# Patient Record
Sex: MALE | Race: WHITE | NOT HISPANIC OR LATINO | URBAN - METROPOLITAN AREA
[De-identification: names, ages, dates, MRNs, and addresses within clinical notes are randomized per-mention and may not be internally consistent; named-entity substitution may affect disease eponyms.]

---

## 2019-03-07 NOTE — PATIENT DISCUSSION
""" to review testing with patient today."" ""Reassured patient that intraocular pressures (IOPs) are at target levels and other ocular findings are stable. Continue present management and/or medication(s).  Follow up as directed. """

## 2021-07-14 NOTE — PATIENT DISCUSSION
IOP stable OU with Timolol use. Continue current drop therapy. Schedule HVF/RNFL OCT at or prior to next visit.

## 2022-02-10 NOTE — PATIENT DISCUSSION
IOP stable OU with Timolol use. Continue current drop therapy.  HVF/RNFL OCT  reviewed with patient today.

## 2022-02-10 NOTE — PATIENT DISCUSSION
Discussed AREDS2 supplements, BP Control, UV protection and dark leafy green vegetables. Intermediate / Complex Repair - Final Wound Length In Cm: 5.5

## 2022-09-01 NOTE — PATIENT DISCUSSION
IOP stable OU with Timolol use. Continue current drop therapy. Follow up in 6 months HVF/RNFL OCT with IOP check.

## 2023-01-30 ENCOUNTER — NEW PATIENT (OUTPATIENT)
Dept: URBAN - METROPOLITAN AREA CLINIC 14 | Facility: CLINIC | Age: 78
End: 2023-01-30

## 2023-01-30 DIAGNOSIS — H25.13: ICD-10-CM

## 2023-01-30 PROCEDURE — 99204 OFFICE O/P NEW MOD 45 MIN: CPT

## 2023-01-30 PROCEDURE — 92015 DETERMINE REFRACTIVE STATE: CPT

## 2023-01-30 PROCEDURE — 92136 OPHTHALMIC BIOMETRY: CPT

## 2023-01-30 ASSESSMENT — KERATOMETRY
OS_K1POWER_DIOPTERS: 42.0
OD_AXISANGLE2_DEGREES: 90
OS_AXISANGLE_DEGREES: 97
OD_K1POWER_DIOPTERS: 42.5
OS_AXISANGLE2_DEGREES: 7
OS_K2POWER_DIOPTERS: 42.75
OD_K2POWER_DIOPTERS: 42.5
OD_AXISANGLE_DEGREES: 180

## 2023-01-30 ASSESSMENT — VISUAL ACUITY
OD_SC: CF 1FT
OS_GLARE: 20/70
OU_SC: 20/30+1
OS_BCVA: 20/25
OD_BCVA: CF 1FT
OS_SC: 20/25-2

## 2023-01-30 ASSESSMENT — TONOMETRY
OD_IOP_MMHG: 16
OS_IOP_MMHG: 16

## 2023-04-04 ENCOUNTER — POST-OP (OUTPATIENT)
Dept: URBAN - METROPOLITAN AREA CLINIC 9 | Facility: CLINIC | Age: 78
End: 2023-04-04

## 2023-04-04 DIAGNOSIS — H25.12: ICD-10-CM

## 2023-04-04 DIAGNOSIS — Z96.1: ICD-10-CM

## 2023-04-04 PROCEDURE — 99024 POSTOP FOLLOW-UP VISIT: CPT

## 2023-04-04 ASSESSMENT — KERATOMETRY
OS_K2POWER_DIOPTERS: 42.75
OD_K2POWER_DIOPTERS: 42.5
OD_AXISANGLE_DEGREES: 180
OS_K1POWER_DIOPTERS: 42.0
OS_AXISANGLE_DEGREES: 97
OS_AXISANGLE2_DEGREES: 7
OD_AXISANGLE2_DEGREES: 90
OD_K1POWER_DIOPTERS: 42.5

## 2023-04-04 ASSESSMENT — VISUAL ACUITY
OD_SC: 20/25
OS_SC: 20/30

## 2023-04-04 ASSESSMENT — TONOMETRY
OS_IOP_MMHG: 12
OD_IOP_MMHG: 11

## 2023-04-05 ENCOUNTER — POST-OP (OUTPATIENT)
Dept: URBAN - METROPOLITAN AREA CLINIC 14 | Facility: CLINIC | Age: 78
End: 2023-04-05

## 2023-04-05 DIAGNOSIS — Z96.1: ICD-10-CM

## 2023-04-05 DIAGNOSIS — H25.12: ICD-10-CM

## 2023-04-05 PROCEDURE — 99024 POSTOP FOLLOW-UP VISIT: CPT

## 2023-04-05 ASSESSMENT — VISUAL ACUITY
OD_SC: 20/25
OS_BCVA: 20/25
OS_SC: 20/40

## 2023-04-05 ASSESSMENT — KERATOMETRY
OS_AXISANGLE2_DEGREES: 7
OS_K2POWER_DIOPTERS: 42.75
OD_AXISANGLE2_DEGREES: 90
OS_AXISANGLE_DEGREES: 97
OS_K1POWER_DIOPTERS: 42.0
OD_K1POWER_DIOPTERS: 42.5
OD_AXISANGLE_DEGREES: 180
OD_K2POWER_DIOPTERS: 42.5

## 2023-04-05 ASSESSMENT — TONOMETRY: OS_IOP_MMHG: 10

## 2023-04-11 ENCOUNTER — FOLLOW UP (OUTPATIENT)
Dept: URBAN - METROPOLITAN AREA CLINIC 9 | Facility: CLINIC | Age: 78
End: 2023-04-11

## 2023-04-11 DIAGNOSIS — H25.12: ICD-10-CM

## 2023-04-11 DIAGNOSIS — Z96.1: ICD-10-CM

## 2023-04-11 PROCEDURE — 99024 POSTOP FOLLOW-UP VISIT: CPT

## 2023-04-11 ASSESSMENT — KERATOMETRY
OS_AXISANGLE2_DEGREES: 7
OS_K2POWER_DIOPTERS: 42.75
OD_K2POWER_DIOPTERS: 42.5
OD_AXISANGLE_DEGREES: 180
OD_AXISANGLE2_DEGREES: 90
OS_K1POWER_DIOPTERS: 42.0
OD_K1POWER_DIOPTERS: 42.5
OS_AXISANGLE_DEGREES: 97

## 2023-04-11 ASSESSMENT — VISUAL ACUITY
OD_SC: 20/25
OS_SC: 20/30

## 2023-04-18 ENCOUNTER — FOLLOW UP (OUTPATIENT)
Dept: URBAN - METROPOLITAN AREA CLINIC 11 | Facility: CLINIC | Age: 78
End: 2023-04-18

## 2023-04-18 DIAGNOSIS — Z96.1: ICD-10-CM

## 2023-04-18 DIAGNOSIS — H35.371: ICD-10-CM

## 2023-04-18 DIAGNOSIS — H43.811: ICD-10-CM

## 2023-04-18 DIAGNOSIS — H43.822: ICD-10-CM

## 2023-04-18 DIAGNOSIS — H25.12: ICD-10-CM

## 2023-04-18 PROCEDURE — 99214 OFFICE O/P EST MOD 30 MIN: CPT

## 2023-04-18 PROCEDURE — 92134 CPTRZ OPH DX IMG PST SGM RTA: CPT

## 2023-04-18 ASSESSMENT — KERATOMETRY
OS_AXISANGLE_DEGREES: 97
OD_K2POWER_DIOPTERS: 42.5
OD_AXISANGLE_DEGREES: 180
OD_AXISANGLE2_DEGREES: 90
OS_AXISANGLE2_DEGREES: 7
OD_K1POWER_DIOPTERS: 42.5
OS_K2POWER_DIOPTERS: 42.75
OS_K1POWER_DIOPTERS: 42.0

## 2023-04-18 ASSESSMENT — TONOMETRY
OS_IOP_MMHG: 7
OD_IOP_MMHG: 6

## 2023-04-18 ASSESSMENT — VISUAL ACUITY
OD_SC: 20/25-2
OS_SC: 20/25-1

## 2023-05-01 ENCOUNTER — ESTABLISHED PATIENT (OUTPATIENT)
Dept: URBAN - METROPOLITAN AREA CLINIC 14 | Facility: CLINIC | Age: 78
End: 2023-05-01

## 2023-05-01 DIAGNOSIS — H25.12: ICD-10-CM

## 2023-05-01 DIAGNOSIS — Z96.1: ICD-10-CM

## 2023-05-01 DIAGNOSIS — H52.7: ICD-10-CM

## 2023-05-01 PROCEDURE — 99024 POSTOP FOLLOW-UP VISIT: CPT

## 2023-05-01 PROCEDURE — 92136 OPHTHALMIC BIOMETRY: CPT

## 2023-05-01 ASSESSMENT — VISUAL ACUITY
OS_BCVA: 20/25
OD_SC: 20/25
OS_SC: 20/40

## 2023-05-01 ASSESSMENT — KERATOMETRY
OD_AXISANGLE2_DEGREES: 90
OS_AXISANGLE2_DEGREES: 7
OS_AXISANGLE_DEGREES: 97
OS_K1POWER_DIOPTERS: 42.0
OS_K2POWER_DIOPTERS: 42.75

## 2023-05-01 ASSESSMENT — TONOMETRY
OS_IOP_MMHG: 15
OD_IOP_MMHG: 15

## 2023-06-21 ENCOUNTER — POST-OP (OUTPATIENT)
Dept: URBAN - METROPOLITAN AREA CLINIC 14 | Facility: CLINIC | Age: 78
End: 2023-06-21

## 2023-06-21 DIAGNOSIS — Z96.1: ICD-10-CM

## 2023-06-21 PROCEDURE — 99024NOCM NON COMANAGED POST OP CARE

## 2023-06-21 ASSESSMENT — VISUAL ACUITY
OU_SC: 20/25
OS_SC: 20/25-1

## 2023-06-21 ASSESSMENT — TONOMETRY
OS_IOP_MMHG: 08
OD_IOP_MMHG: 09